# Patient Record
Sex: MALE | Race: WHITE | NOT HISPANIC OR LATINO | Employment: OTHER | URBAN - METROPOLITAN AREA
[De-identification: names, ages, dates, MRNs, and addresses within clinical notes are randomized per-mention and may not be internally consistent; named-entity substitution may affect disease eponyms.]

---

## 2017-02-17 ENCOUNTER — TELEPHONE (OUTPATIENT)
Dept: SURGERY | Facility: CLINIC | Age: 67
End: 2017-02-17

## 2017-02-17 NOTE — TELEPHONE ENCOUNTER
----- Message from PJ Pascal sent at 2/17/2017  3:11 PM CST -----  Contact: self - [phone]  Please call and tell [facility] to call  [phone] that is [facility]     I am still seeingpt and have a bunch of messages and all my charts to do   ----- Message -----     From: Perri Sanchez LPN     Sent: 2/17/2017   1:57 PM       To: PJ Pascal        ----- Message -----     From: Perri Fulton     Sent: 2/17/2017   1:33 PM       To: Maria Eugenia White Staff    maria eugenia - needs refills on ostomy supplies called in to [facility] - pt does not have their phone number - please call patient at

## 2017-03-01 DIAGNOSIS — Z43.2 ATTENTION TO ILEOSTOMY: Primary | ICD-10-CM

## 2018-04-24 ENCOUNTER — TELEPHONE (OUTPATIENT)
Dept: SURGERY | Facility: CLINIC | Age: 68
End: 2018-04-24

## 2018-04-24 NOTE — TELEPHONE ENCOUNTER
Pt says he has just been diagnosed with Prostate cancer. He wanted to know if Dr Malone would treat that. Told him it would a Urologist. He is going to wait to see what the doctor that did the bx tells him. If not comfortable will call to make an appt here.

## 2018-04-24 NOTE — TELEPHONE ENCOUNTER
----- Message from Selina Moss sent at 4/24/2018  2:35 PM CDT -----  Contact: pt#[phone]  Pt states that he has other issue and he wants to know if Dr Malone can help with it. He did not want to give details

## 2018-05-14 ENCOUNTER — LAB VISIT (OUTPATIENT)
Dept: LAB | Facility: HOSPITAL | Age: 68
End: 2018-05-14
Attending: UROLOGY
Payer: MEDICARE

## 2018-05-14 ENCOUNTER — OFFICE VISIT (OUTPATIENT)
Dept: UROLOGY | Facility: CLINIC | Age: 68
End: 2018-05-14
Payer: MEDICARE

## 2018-05-14 VITALS
WEIGHT: 248.88 LBS | HEIGHT: 71 IN | DIASTOLIC BLOOD PRESSURE: 69 MMHG | SYSTOLIC BLOOD PRESSURE: 133 MMHG | BODY MASS INDEX: 34.84 KG/M2 | HEART RATE: 67 BPM

## 2018-05-14 DIAGNOSIS — C61 PROSTATE CANCER: Primary | ICD-10-CM

## 2018-05-14 DIAGNOSIS — C61 PROSTATE CANCER: ICD-10-CM

## 2018-05-14 DIAGNOSIS — R35.1 NOCTURIA: ICD-10-CM

## 2018-05-14 DIAGNOSIS — D49.59 NEOPLASM OF PROSTATE: ICD-10-CM

## 2018-05-14 DIAGNOSIS — N40.1 BENIGN NON-NODULAR PROSTATIC HYPERPLASIA WITH LOWER URINARY TRACT SYMPTOMS: ICD-10-CM

## 2018-05-14 LAB
ANION GAP SERPL CALC-SCNC: 6 MMOL/L
BUN SERPL-MCNC: 12 MG/DL
CALCIUM SERPL-MCNC: 8.7 MG/DL
CHLORIDE SERPL-SCNC: 112 MMOL/L
CO2 SERPL-SCNC: 24 MMOL/L
COMPLEXED PSA SERPL-MCNC: 4 NG/ML
CREAT SERPL-MCNC: 0.9 MG/DL
EST. GFR  (AFRICAN AMERICAN): >60 ML/MIN/1.73 M^2
EST. GFR  (NON AFRICAN AMERICAN): >60 ML/MIN/1.73 M^2
GLUCOSE SERPL-MCNC: 115 MG/DL
POTASSIUM SERPL-SCNC: 4.3 MMOL/L
SODIUM SERPL-SCNC: 142 MMOL/L

## 2018-05-14 PROCEDURE — 84153 ASSAY OF PSA TOTAL: CPT

## 2018-05-14 PROCEDURE — 80048 BASIC METABOLIC PNL TOTAL CA: CPT

## 2018-05-14 PROCEDURE — 99999 PR PBB SHADOW E&M-EST. PATIENT-LVL III: CPT | Mod: PBBFAC,,, | Performed by: UROLOGY

## 2018-05-14 PROCEDURE — 99204 OFFICE O/P NEW MOD 45 MIN: CPT | Mod: S$GLB,,, | Performed by: UROLOGY

## 2018-05-14 PROCEDURE — 36415 COLL VENOUS BLD VENIPUNCTURE: CPT

## 2018-05-14 RX ORDER — SIMVASTATIN 40 MG/1
40 TABLET, FILM COATED ORAL
COMMUNITY
Start: 2018-04-17

## 2018-05-14 RX ORDER — FINASTERIDE 5 MG/1
5 TABLET, FILM COATED ORAL
COMMUNITY
Start: 2018-04-17 | End: 2019-04-16

## 2018-05-14 RX ORDER — AMLODIPINE BESYLATE 10 MG/1
10 TABLET ORAL
COMMUNITY
Start: 2018-04-17 | End: 2019-06-18

## 2018-05-14 RX ORDER — ASPIRIN 325 MG
325 TABLET ORAL DAILY
COMMUNITY
End: 2019-04-16

## 2018-05-14 RX ORDER — CARVEDILOL 3.12 MG/1
3.12 TABLET ORAL
COMMUNITY
Start: 2018-04-17

## 2018-05-14 RX ORDER — CLOPIDOGREL BISULFATE 75 MG/1
75 TABLET ORAL
COMMUNITY
Start: 2018-04-17

## 2018-05-14 RX ORDER — TRAMADOL HYDROCHLORIDE 50 MG/1
50 TABLET ORAL
COMMUNITY
Start: 2018-02-20

## 2018-05-14 NOTE — PROGRESS NOTES
CHIEF COMPLAINT:    Mr. Bernard is a 68 y.o. male presenting for a consultation. Patient presents with Prostate Cancer.    PRESENTING ILLNESS:    Sha Bernard is a 68 y.o. male w/ hx of UC s/p total abdominal colectomy in 2015, DVT w/ asymtptomatic PE (provoked) IVC filter still in place, CAD w/ MI in 2004 (2 stents on aspirin and plavix), HTN, who presents with emily 6 prostate cancer on biopsy 4/12/18 for elevated PSA, (he does not have PSA records with him today), saw Dr. Aragon in [place] who was planning on open prostatectomy. Denies any FH of prostate cancer or other  malignancy. On finasteride and tamsulosin for BPH, he reports his symptoms are well controlled. Denies blood in urine.     Erectile dysfunction, has failed viagra in the past, possibly interested in ICI in the future     He also reports chronic fatigue and back pain. Denies bone pain/ night sweats. 10lb weight loss since girlfriend passing away 2 months ago.      Prostate Biopsy 4/13/18: Right 3+3=6 5/8 cores 19%, Left 3+3=6 3/8 cores 18%      REVIEW OF SYSTEMS:    Sha Bernard  Endorses SOB on exertion, denies chest pain, headache, blurred vision, fever, nausea, vomiting, chills, abdominal pain, bleeding per rectum, cough, recent loss of consciousness, recent mental status changes, seizures, dizziness, or upper or lower extremity weakness.      PATIENT HISTORY:    Past Medical History:   Diagnosis Date    Arthritis     CAD (coronary artery disease)     DVT (deep venous thrombosis)     HLD (hyperlipidemia)     HTN (hypertension)     Inflammatory bowel diseases (IBD)     PE (pulmonary embolism)        Past Surgical History:   Procedure Laterality Date    CARDIAC SURGERY      EYE SURGERY      FRACTURE SURGERY      HERNIA REPAIR      SKIN BIOPSY      TONSILLECTOMY      VASCULAR SURGERY         History reviewed. No pertinent family history.    Social History     Social History    Marital status:      Spouse name: N/A     Number of children: N/A    Years of education: N/A     Occupational History    Not on file.     Social History Main Topics    Smoking status: Never Smoker    Smokeless tobacco: Not on file    Alcohol use No    Drug use: No    Sexual activity: Not on file     Other Topics Concern    Not on file     Social History Narrative    No narrative on file       Allergies:  Patient has no known allergies.    Medications:    Current Outpatient Prescriptions:     amLODIPine (NORVASC) 10 MG tablet, Take 10 mg by mouth., Disp: , Rfl:     aspirin 325 MG tablet, Take 325 mg by mouth once daily., Disp: , Rfl:     atorvastatin (LIPITOR) 40 MG tablet, Take 40 mg by mouth once daily., Disp: , Rfl:     carvedilol (COREG) 3.125 MG tablet, Take 3.125 mg by mouth., Disp: , Rfl:     clopidogrel (PLAVIX) 75 mg tablet, Take 75 mg by mouth., Disp: , Rfl:     finasteride (PROSCAR) 5 mg tablet, Take 5 mg by mouth., Disp: , Rfl:     metoprolol tartrate (LOPRESSOR) 50 MG tablet, Take 50 mg by mouth 2 (two) times daily., Disp: , Rfl:     tamsulosin (FLOMAX) 0.4 mg Cp24, Take 0.8 mg by mouth once daily., Disp: , Rfl:     traMADol (ULTRAM) 50 mg tablet, Take 50 mg by mouth., Disp: , Rfl:     lisinopril (PRINIVIL,ZESTRIL) 20 MG tablet, Take 20 mg by mouth once daily., Disp: , Rfl:     simvastatin (ZOCOR) 40 MG tablet, Take 40 mg by mouth., Disp: , Rfl:     PHYSICAL EXAMINATION:    The patient generally appears in good health, is appropriately interactive, and is in no apparent distress.     Eyes: anicteric sclerae, moist conjunctivae; no lid-lag; PERRLA     HENT: Atraumatic; oropharynx clear with moist mucous membranes and no mucosal ulcerations;normal hard and soft palate.  No evidence of lymphadenopathy.    Neck: Trachea midline.  No thyromegaly.    Musculoskeletal: No abnormal gait.    Skin: No lesions.    Mental: Cooperative with normal affect.  Is oriented to time, place, and person.    Neuro: Grossly intact.    Chest: Normal  inspiratory effort.   No accessory muscles.  No audible wheezes.  Respirations symmetric on inspiration and expiration.    Heart: Regular rhythm.      Abdomen:  Soft, non-tender. No masses or organomegaly. Ileostomy in RLQ, with parastomal hernia, umbilical hernia at previous extraction site from TAC, other port sites well healed. Bladder is not palpable. No evidence of flank discomfort. No evidence of inguinal hernia.    Genitourinary: The penis is circumcised with no evidence of plaques or induration. The urethral meatus is normal. The testes, epididymides, and cord structures are normal in size and contour bilaterally, small granulomas are present bilaterally at site of previous vasectomy. The scrotum is normal in size and contour.    Normal anal sphincter tone. No rectal mass.    The prostate is 35 g. Normal landmarks. Lateral sulci. Median furrow intact.  No nodularity or induration. Seminal vesicles not palpable.    Extremities: No clubbing, cyanosis, or edema      LABS:    No results found for: PSA, PSADIAG, PSATOTAL, PSAFREE, PSAFREEPCT    IMPRESSION:    Encounter Diagnoses   Name Primary?    Prostate cancer Yes    Benign non-nodular prostatic hyperplasia with lower urinary tract symptoms     Nocturia     Neoplasm of prostate      Options for treatment and surveillance of prostate cancer were discussed including radiation, brachytherapy, prostatectomy, and active surveillance. Patient would like treatment for his prostate cancer and is interested in radiation treatment, and would like MRI. We will obtain PSA today in order to risk stratify his prostate cancer.     PLAN:    1. PSA  2. Appointment to see Dr. Fox with Radiation Oncology  3. CMP  4. Prostate MRI      Copy to:

## 2018-05-15 ENCOUNTER — TELEPHONE (OUTPATIENT)
Dept: UROLOGY | Facility: CLINIC | Age: 68
End: 2018-05-15

## 2018-05-15 ENCOUNTER — HOSPITAL ENCOUNTER (OUTPATIENT)
Dept: RADIOLOGY | Facility: HOSPITAL | Age: 68
Discharge: HOME | End: 2018-05-15
Attending: UROLOGY
Payer: MEDICARE

## 2018-05-15 DIAGNOSIS — D49.59 NEOPLASM OF PROSTATE: ICD-10-CM

## 2018-05-15 PROCEDURE — 72197 MRI PELVIS W/O & W/DYE: CPT | Mod: 26,,, | Performed by: RADIOLOGY

## 2018-05-15 PROCEDURE — 25500020 PHARM REV CODE 255: Performed by: UROLOGY

## 2018-05-15 PROCEDURE — A9585 GADOBUTROL INJECTION: HCPCS | Performed by: UROLOGY

## 2018-05-15 PROCEDURE — 72197 MRI PELVIS W/O & W/DYE: CPT | Mod: TC

## 2018-05-15 RX ORDER — GADOBUTROL 604.72 MG/ML
10 INJECTION INTRAVENOUS
Status: COMPLETED | OUTPATIENT
Start: 2018-05-15 | End: 2018-05-15

## 2018-05-15 RX ADMIN — GADOBUTROL 10 ML: 604.72 INJECTION INTRAVENOUS at 10:05

## 2018-05-15 NOTE — TELEPHONE ENCOUNTER
----- Message from Quang Pinto MD sent at 5/14/2018  5:06 PM CDT -----  Please notify the patient that his psa was 4.0. This is normal.

## 2018-05-21 DIAGNOSIS — R97.20 ELEVATED PSA: Primary | ICD-10-CM

## 2018-05-21 RX ORDER — CIPROFLOXACIN 500 MG/1
TABLET ORAL
Qty: 4 TABLET | Refills: 0 | Status: SHIPPED | OUTPATIENT
Start: 2018-05-21 | End: 2019-04-16

## 2018-05-22 ENCOUNTER — TELEPHONE (OUTPATIENT)
Dept: UROLOGY | Facility: CLINIC | Age: 68
End: 2018-05-22

## 2018-06-27 ENCOUNTER — PROCEDURE VISIT (OUTPATIENT)
Dept: UROLOGY | Facility: CLINIC | Age: 68
End: 2018-06-27
Payer: MEDICARE

## 2018-06-27 VITALS
TEMPERATURE: 99 F | BODY MASS INDEX: 34.9 KG/M2 | HEART RATE: 65 BPM | WEIGHT: 249.31 LBS | HEIGHT: 71 IN | RESPIRATION RATE: 20 BRPM | DIASTOLIC BLOOD PRESSURE: 72 MMHG | SYSTOLIC BLOOD PRESSURE: 119 MMHG

## 2018-06-27 DIAGNOSIS — R97.20 ELEVATED PSA: Primary | ICD-10-CM

## 2018-06-27 PROCEDURE — 76872 US TRANSRECTAL: CPT | Mod: S$GLB,,, | Performed by: UROLOGY

## 2018-06-27 PROCEDURE — 88305 TISSUE EXAM BY PATHOLOGIST: CPT | Mod: 26,,, | Performed by: PATHOLOGY

## 2018-06-27 PROCEDURE — 76942 ECHO GUIDE FOR BIOPSY: CPT | Mod: 59,S$GLB,, | Performed by: UROLOGY

## 2018-06-27 PROCEDURE — 55700 PR BIOPSY OF PROSTATE,NEEDLE/PUNCH: CPT | Mod: S$GLB,,, | Performed by: UROLOGY

## 2018-06-27 PROCEDURE — 88305 TISSUE EXAM BY PATHOLOGIST: CPT | Performed by: PATHOLOGY

## 2018-06-27 RX ORDER — LIDOCAINE HYDROCHLORIDE 20 MG/ML
JELLY TOPICAL ONCE
Status: COMPLETED | OUTPATIENT
Start: 2018-06-27 | End: 2018-06-27

## 2018-06-27 RX ORDER — LIDOCAINE HYDROCHLORIDE 10 MG/ML
1 INJECTION INFILTRATION; PERINEURAL
Status: COMPLETED | OUTPATIENT
Start: 2018-06-27 | End: 2018-06-27

## 2018-06-27 RX ADMIN — LIDOCAINE HYDROCHLORIDE 1 ML: 10 INJECTION INFILTRATION; PERINEURAL at 11:06

## 2018-06-27 RX ADMIN — LIDOCAINE HYDROCHLORIDE: 20 JELLY TOPICAL at 10:06

## 2018-06-27 NOTE — PATIENT INSTRUCTIONS

## 2018-06-27 NOTE — PROCEDURES
Procedures     Pre-procedure diagnosis:   1. Prostate cancer, [place] 3+3  2. PIRADS 5 prostate lesion on MRI      Post-procedure diagnosis: same    Procedure: Transrectal URONAV MRI fusion-ultrasound guided prostate biopsy    TRUS size: 44.1cc    Complications: none    Blood loss: minimal    Surgeon: Quang Pinto MD    Anesthesia: 10cc lidocaine jelly, 20cc 1% lidocaine for prostatic block    Procedure: The risks and benefits of the procedure were explained to the patient and consent was obtained.  The patient laid in the lateral decubitus position.  10cc of lidocaine jelly was used for local analgesia in the rectum.  The ultrasound probe was advanced into the rectum. The prostate was visualized.  There was not a median lobe.  20cc of 1% lidocaine without epi was used for a prostatic nerve block.    At this point, a sweep of the prostate was performed from base to apex and the transverse plane using the ultrasound and URONAV platform.  Landmarks were then labeled with anterior, posterior, right, left, base and apex were labeled in the axial as well as sagittal planes.  Segmentation was then performed and manual adjustments were made as needed starting in the sagittal plane followed by the axial plane.  At this point, alignment of the ultrasound with MRI images was ensured using the toggle between ultrasound and MRI.      At this point elastic deformation was computed.  Our images were satisfactory.  At this point, we performed 5 biopsies of a target lesion.  Subsequent to that, a standard 12core biopsy was performed, 2 from the apex, mid and base from the right and left side.    The patient tolerated the procedure well and was discharged home.  We will call him when the results are available for review.  He will finish the course of antibiotics.

## 2018-07-05 ENCOUNTER — TELEPHONE (OUTPATIENT)
Dept: WOUND CARE | Facility: CLINIC | Age: 68
End: 2018-07-05

## 2018-07-05 NOTE — TELEPHONE ENCOUNTER
----- Message from Yg Hurtado sent at 7/3/2018  1:56 PM CDT -----  Contact: Pt:[phone]  .Rx Refill/Request     Is this a Refill or New Rx:Rfill    Rx Name and Strength: Ostomy bags  Preferred Pharmacy with phone number: [facility] pt does not know the name  Communication Preference:Pt:[phone]  Additional Information:

## 2018-07-05 NOTE — TELEPHONE ENCOUNTER
Connected pt with [facility] as his supplier and order shipped within 24 hours.   New script signed and faxed

## 2019-04-01 ENCOUNTER — OFFICE VISIT (OUTPATIENT)
Dept: UROLOGY | Facility: CLINIC | Age: 69
End: 2019-04-01
Payer: MEDICARE

## 2019-04-01 ENCOUNTER — LAB VISIT (OUTPATIENT)
Dept: LAB | Facility: HOSPITAL | Age: 69
End: 2019-04-01
Attending: UROLOGY
Payer: MEDICARE

## 2019-04-01 VITALS
HEART RATE: 57 BPM | HEIGHT: 71 IN | BODY MASS INDEX: 35.42 KG/M2 | SYSTOLIC BLOOD PRESSURE: 150 MMHG | DIASTOLIC BLOOD PRESSURE: 79 MMHG | WEIGHT: 253 LBS | TEMPERATURE: 98 F

## 2019-04-01 DIAGNOSIS — C61 PROSTATE CANCER: Primary | ICD-10-CM

## 2019-04-01 DIAGNOSIS — N52.01 ERECTILE DYSFUNCTION DUE TO ARTERIAL INSUFFICIENCY: ICD-10-CM

## 2019-04-01 DIAGNOSIS — D49.59 NEOPLASM OF PROSTATE: Primary | ICD-10-CM

## 2019-04-01 DIAGNOSIS — C61 PROSTATE CANCER: ICD-10-CM

## 2019-04-01 LAB — COMPLEXED PSA SERPL-MCNC: 9.3 NG/ML (ref 0–4)

## 2019-04-01 PROCEDURE — 84153 ASSAY OF PSA TOTAL: CPT

## 2019-04-01 PROCEDURE — 99999 PR PBB SHADOW E&M-EST. PATIENT-LVL III: ICD-10-PCS | Mod: PBBFAC,,, | Performed by: UROLOGY

## 2019-04-01 PROCEDURE — 99214 PR OFFICE/OUTPT VISIT, EST, LEVL IV, 30-39 MIN: ICD-10-PCS | Mod: S$GLB,,, | Performed by: UROLOGY

## 2019-04-01 PROCEDURE — 99999 PR PBB SHADOW E&M-EST. PATIENT-LVL III: CPT | Mod: PBBFAC,,, | Performed by: UROLOGY

## 2019-04-01 PROCEDURE — 99214 OFFICE O/P EST MOD 30 MIN: CPT | Mod: S$GLB,,, | Performed by: UROLOGY

## 2019-04-01 PROCEDURE — 36415 COLL VENOUS BLD VENIPUNCTURE: CPT

## 2019-04-01 NOTE — PROGRESS NOTES
Subjective:       Patient ID: Sha Bernard is a 69 y.o. male.    Chief Complaint:  Other (discuss surgery)        History of Present Illness  HPI  Patient is a 69 y.o. male who is established to our clinic and was initially self-referred for evaluation of an elevated psa.  He previously underwent a URONAV showing multi-focal [place] 3+3 prostate cancer.     He was somewhat lost to f/u.  He returns today saying he is ready to have his prostate cancer treated.   He had an MRI showing a 2cm PIRADS 5 lesion without JINNY, no SV involvement, no NV bundle involvement.          Review of Systems  Review of Systems  All other systems reviewed and negative except pertinent positives noted in HPI.       Objective:     Physical Exam   Nursing note and vitals reviewed.  Constitutional: He is oriented to person, place, and time. Vital signs are normal. He appears well-developed and well-nourished. He is cooperative.   HENT:   Head: Normocephalic.   Neck: No tracheal deviation present.   Cardiovascular: Normal rate and intact distal pulses.    Pulmonary/Chest: Effort normal. No accessory muscle usage. No tachypnea. No respiratory distress.   Abdominal: Soft. Normal appearance. He exhibits no distension, no fluid wave, no ascites and no mass. There is no tenderness. There is no rebound and no CVA tenderness. No hernia.   Musculoskeletal: Normal range of motion.   Lymphadenopathy:        Right: No inguinal adenopathy present.        Left: No inguinal adenopathy present.   Neurological: He is alert and oriented to person, place, and time. He has normal strength.   Skin: No bruising and no rash noted.     Psychiatric: He has a normal mood and affect. His speech is normal and behavior is normal. Thought content normal.       Lab Review  Lab Results   Component Value Date    COLORU Orange (A) 03/26/2015    SPECGRAV 1.030 03/26/2015    PHUR 6.0 03/26/2015    NITRITE Negative 03/26/2015    KETONESU Negative 03/26/2015    UROBILINOGEN  Negative 03/26/2015         Assessment:        1. Prostate cancer    2. Erectile dysfunction due to arterial insufficiency            Plan:     Prostate cancer  -     Prostate Specific Antigen, Diagnostic; Future; Expected date: 04/01/2019  -     Ambulatory referral to Radiation Oncology    Erectile dysfunction due to arterial insufficiency    -psa today  -refer to Dr. Fox to discuss XRT.  -return to clinic in 1 month to re-discuss.   -I explained at length that given his prior surgical history and his ileostomy site, that surgical removal may not be feasible/safe. He will consider options and will see Dr. Fox.   I spent 25 minutes with the patient of which more than half was spent in direct consultation with the patient in regards to our treatment and plan.    F/u 1 month.

## 2019-04-12 ENCOUNTER — HOSPITAL ENCOUNTER (OUTPATIENT)
Dept: RADIOLOGY | Facility: HOSPITAL | Age: 69
Discharge: HOME | End: 2019-04-12
Attending: UROLOGY
Payer: MEDICARE

## 2019-04-12 DIAGNOSIS — D49.59 NEOPLASM OF PROSTATE: ICD-10-CM

## 2019-04-12 PROCEDURE — 72197 MRI PELVIS W/O & W/DYE: CPT | Mod: 26,,, | Performed by: RADIOLOGY

## 2019-04-12 PROCEDURE — 72197 MRI PELVIS W/O & W/DYE: CPT | Mod: TC

## 2019-04-12 PROCEDURE — 25500020 PHARM REV CODE 255: Performed by: UROLOGY

## 2019-04-12 PROCEDURE — 72197 MRI PROSTATE W W/O CONTRAST: ICD-10-PCS | Mod: 26,,, | Performed by: RADIOLOGY

## 2019-04-12 PROCEDURE — A9585 GADOBUTROL INJECTION: HCPCS | Performed by: UROLOGY

## 2019-04-12 RX ORDER — GADOBUTROL 604.72 MG/ML
10 INJECTION INTRAVENOUS
Status: COMPLETED | OUTPATIENT
Start: 2019-04-12 | End: 2019-04-12

## 2019-04-12 RX ADMIN — GADOBUTROL 10 ML: 604.72 INJECTION INTRAVENOUS at 04:04

## 2019-04-15 LAB
CREAT SERPL-MCNC: 0.9 MG/DL (ref 0.5–1.4)
SAMPLE: NORMAL

## 2019-04-16 ENCOUNTER — TELEPHONE (OUTPATIENT)
Dept: RADIATION ONCOLOGY | Facility: CLINIC | Age: 69
End: 2019-04-16

## 2019-04-16 ENCOUNTER — INITIAL CONSULT (OUTPATIENT)
Dept: RADIATION ONCOLOGY | Facility: CLINIC | Age: 69
End: 2019-04-16
Payer: MEDICARE

## 2019-04-16 VITALS
SYSTOLIC BLOOD PRESSURE: 144 MMHG | HEIGHT: 71 IN | DIASTOLIC BLOOD PRESSURE: 72 MMHG | HEART RATE: 65 BPM | RESPIRATION RATE: 18 BRPM | WEIGHT: 260.13 LBS | BODY MASS INDEX: 36.42 KG/M2

## 2019-04-16 DIAGNOSIS — C61 PROSTATE CANCER: ICD-10-CM

## 2019-04-16 PROCEDURE — 99999 PR PBB SHADOW E&M-EST. PATIENT-LVL III: CPT | Mod: PBBFAC,,, | Performed by: RADIOLOGY

## 2019-04-16 PROCEDURE — 99204 PR OFFICE/OUTPT VISIT, NEW, LEVL IV, 45-59 MIN: ICD-10-PCS | Mod: S$GLB,,, | Performed by: RADIOLOGY

## 2019-04-16 PROCEDURE — 99999 PR PBB SHADOW E&M-EST. PATIENT-LVL III: ICD-10-PCS | Mod: PBBFAC,,, | Performed by: RADIOLOGY

## 2019-04-16 PROCEDURE — 99204 OFFICE O/P NEW MOD 45 MIN: CPT | Mod: S$GLB,,, | Performed by: RADIOLOGY

## 2019-04-16 RX ORDER — LISINOPRIL 40 MG/1
TABLET ORAL
Refills: 1 | COMMUNITY
Start: 2019-03-28

## 2019-04-16 RX ORDER — NAPROXEN SODIUM 220 MG/1
81 TABLET, FILM COATED ORAL
COMMUNITY
Start: 2019-02-26

## 2019-04-16 NOTE — PROGRESS NOTES
HISTORY OF PRESENT ILLNESS:   This patient presents today for discussion of treatment options for a recently diagnosed prostate cancer.     Mr. Bernard's PMHx is significant for ulcerative colitis.  He is status post total abdominal colectomy with end ileostomy per Dr. Malone in 2015.  Currently he has no active symptoms.  He has a history of BPH and was followed by Dr. Syed in [place], [place].  In March of 2018 he was noted to have a PSA of 3.8 ng/ml.  At that time the patient was taking Proscar and Flomax at 0.8 mg per day.  TRUS and biopsy was performed on 4/12/18.  The prostate measured 42 cc.  Biopsies revealed [place] 6 (3+3) adenocarcinoma involving 5 of 8 cores from the Rt. lobe and 3 of 8 cores from the Lt. lobe.   The patient was referred to Dr. Pinto for evaluation.  Repeat PSA on 5/14/18 returned at 4 ng/ml.  Further work up with MRI scan on 5/15/18 revealed a 4.4 x 4.3 x 4.9 cm prostate corresponding to a 39.5 cc gland.  There was 2 cm T2 hypointense lesion in the Rt. posterior peripheral zone, PI-RADS 5.  There was no extraprostatic extension and the seminal vesicles and neurovascular bundles were unremarkable.  There was no lymphadenopathy.    Repeat biopsies on 6/27/18 revealed [place] 6 (3+3) adenocarcinoma in biopsies from the Lt. apex, Rt. apex, Rt. mid gland and the targeted lesion.  The tumor involved 5% of the specimens from each of the prostate regions and 10% of the targeted specimens.  The patient elected to proceed with active surveillance.  Repeat PSA on 4/1/19  returned at 9.3 ng/ml.  Repeat MRI on 4/12/19 revealed a 4.0 x 4.8 x 4.3 cm prostate corresponding to a 50.4 cc gland.  Again there was a 1.5 cm T2 hypointense lesion in the Rt. peripheral zone, PI-RADS 5. There was capsular abutment without definitve extraprostatic extension.  There was a 1.1 cm T2 hypointense lesion in the Lt. transitional zone, PI-RADS 4. There was no extraprostatic extension.  Again the seminal vesicles  and neurovascular bundles were unremarkable.  There was no adenopathy.  The patient now presents for discussion of treatment options.  Today, the patient states he feels well.  Notes nocturia at 2 - 3 times per night.      REVIEW OF SYSTEMS:   Review of Systems   Constitutional: Negative for chills, fever, malaise/fatigue and weight loss.   Respiratory: Negative for cough, hemoptysis and sputum production.    Cardiovascular: Negative for chest pain, palpitations and orthopnea.   Gastrointestinal: Negative for abdominal pain, constipation, diarrhea, nausea and vomiting.   Genitourinary: Negative for dysuria, frequency, hematuria and urgency.        Occasional urinary hesitancy.          PAST MEDICAL HISTORY:  Past Medical History:   Diagnosis Date    Arthritis     CAD (coronary artery disease)     DVT (deep venous thrombosis)     HLD (hyperlipidemia)     HTN (hypertension)     Inflammatory bowel diseases (IBD)     PE (pulmonary embolism)     Prostate cancer     Prostate cancer 4/16/2019       PAST SURGICAL HISTORY:  Past Surgical History:   Procedure Laterality Date    CARDIAC SURGERY      COLECTOMY-TOTAL ABDOMINAL with End Ileostomy Hand-assisted laparoscopic  Lithotomy N/A 4/17/2015    Performed by [facility] at [facility] OR 2ND FLR    EYE SURGERY      FRACTURE SURGERY      HERNIA REPAIR      ILEOSTOMY - end  N/A 4/17/2015    Performed by [facility] at [facility] OR 2ND FLR    PLACEMENT-IVC FILTER N/A 4/15/2015    Performed by MIGUEL Dan III, MD at [facility] CATH LAB    SKIN BIOPSY      TONSILLECTOMY      VASCULAR SURGERY         ALLERGIES:   Review of patient's allergies indicates:  No Known Allergies    MEDICATIONS:  Current Outpatient Medications   Medication Sig    amLODIPine (NORVASC) 10 MG tablet Take 10 mg by mouth.    aspirin 81 MG Chew Take 81 mg by mouth.    carvedilol (COREG) 3.125 MG tablet Take 3.125 mg by mouth.    clopidogrel (PLAVIX) 75 mg tablet Take 75 mg by mouth.     lisinopril (PRINIVIL,ZESTRIL) 40 MG tablet     simvastatin (ZOCOR) 40 MG tablet Take 40 mg by mouth.    tamsulosin (FLOMAX) 0.4 mg Cp24 Take 0.8 mg by mouth once daily.    traMADol (ULTRAM) 50 mg tablet Take 50 mg by mouth.     No current facility-administered medications for this visit.        SOCIAL HISTORY:  Social History     Socioeconomic History    Marital status:      Spouse name: Not on file    Number of children: Not on file    Years of education: Not on file    Highest education level: Not on file   Occupational History    Not on file   Social Needs    Financial resource strain: Not on file    Food insecurity:     Worry: Not on file     Inability: Not on file    Transportation needs:     Medical: Not on file     Non-medical: Not on file   Tobacco Use    Smoking status: Never Smoker    Smokeless tobacco: Never Used   Substance and Sexual Activity    Alcohol use: No     Alcohol/week: 0.0 oz    Drug use: No    Sexual activity: Not on file   Lifestyle    Physical activity:     Days per week: Not on file     Minutes per session: Not on file    Stress: Not on file   Relationships    Social connections:     Talks on phone: Not on file     Gets together: Not on file     Attends [employer] service: Not on file     Active member of club or organization: Not on file     Attends meetings of clubs or organizations: Not on file     Relationship status: Not on file   Other Topics Concern    Not on file   Social History Narrative    Not on file       FAMILY HISTORY:  History reviewed. No pertinent family history.      PHYSICAL EXAMINATION:  Vitals:    04/16/19 1411   BP: (!) 144/72   Pulse: 65   Resp: 18     Physical Exam   Constitutional: He is oriented to person, place, and time and well-developed, well-nourished, and in no distress.   Pulmonary/Chest: Effort normal. No respiratory distress.   Abdominal: Soft. He exhibits no distension.   Neurological: He is alert and oriented to person,  place, and time.   Psychiatric: Affect and judgment normal.       ASSESSMENT/PLAN:  Stage I (cT1c, cN0, cM0, PSA: 8, Grade Group: 1) adenocarcinoma of the prostate.     ECO    I had a long discussion with the patient and his wife.  We reviewed his diagnosis, stage, grade, risk group, and prognosis. We discussed the concept of low risk, moderate risk, and high risk disease.  I explained to the patient he is considered to have low risk  prostate cancer.  We discussed the different treatment options including active surveillance, prostate brachytherapy, external beam treatment using IMRT /  IGRT techniques and robotic prostatectomy.We discussed the advantages, disadvantages, risks and benefits, as well as complications of each option. Regarding radiation therapy we discussed treatment planning, the different techniques, short and long term complications. Overall the patient, on paper, would be considered for prostate brachytherapy although I explained I am somewhat concerned about the inflammation following the procedure and his underlying LUTS.  Explained after the procedure, he could develop worsening urinary hesitancy, increased frequency and possible urinary retention requiring use of a catheter.   The patient is considering his options.  Will plan repeat PSA with follow up in .      Psychosocial Distress screening score of Distress Score: 2 noted and reviewed. No intervention indicated.    I spent approximately 60 minutes reviewing the available records and evaluating the patient, out of which over 50% of the time was spent face to face with the patient in counseling and coordinating this patient's care.

## 2019-04-16 NOTE — LETTER
April 16, 2019      Quang Pinto MD  [address]  [facility] [address]           [facility] - Radiation Oncology  [address]  [facility] [address]  Phone: [phone]          Patient: Sha Bernard   MR Number: [id number]   YOB: 1950   Date of Visit: 4/16/2019       Dear Dr. Quang Pinto:    Thank you for referring Sha Bernard to me for evaluation. Attached you will find relevant portions of my assessment and plan of care.    If you have questions, please do not hesitate to call me. I look forward to following Sha Bernard along with you.    Sincerely,    Sam Fox Jr., MD    Enclosure  CC:  No Recipients    If you would like to receive this communication electronically, please contact [email or web address] or [phone] to request more information on [employer] access.    For providers and/or their staff who would like to refer a patient to [facility], please contact us through our one-stop-shop provider referral line, [facility] [place], at [phone].    If you feel you have received this communication in error or would no longer like to receive these types of communications, please e-mail [email or web address]

## 2019-06-11 ENCOUNTER — LAB VISIT (OUTPATIENT)
Dept: LAB | Facility: HOSPITAL | Age: 69
End: 2019-06-11
Attending: RADIOLOGY
Payer: MEDICARE

## 2019-06-11 DIAGNOSIS — C61 PROSTATE CANCER: ICD-10-CM

## 2019-06-11 LAB — COMPLEXED PSA SERPL-MCNC: 9.6 NG/ML (ref 0–4)

## 2019-06-11 PROCEDURE — 36415 COLL VENOUS BLD VENIPUNCTURE: CPT

## 2019-06-11 PROCEDURE — 84153 ASSAY OF PSA TOTAL: CPT

## 2019-06-18 ENCOUNTER — OFFICE VISIT (OUTPATIENT)
Dept: RADIATION ONCOLOGY | Facility: CLINIC | Age: 69
End: 2019-06-18
Payer: MEDICARE

## 2019-06-18 VITALS
WEIGHT: 267.63 LBS | HEART RATE: 69 BPM | DIASTOLIC BLOOD PRESSURE: 74 MMHG | BODY MASS INDEX: 37.47 KG/M2 | SYSTOLIC BLOOD PRESSURE: 149 MMHG | RESPIRATION RATE: 16 BRPM | HEIGHT: 71 IN

## 2019-06-18 DIAGNOSIS — C61 PROSTATE CANCER: Primary | ICD-10-CM

## 2019-06-18 PROCEDURE — 99212 PR OFFICE/OUTPT VISIT, EST, LEVL II, 10-19 MIN: ICD-10-PCS | Mod: S$GLB,,, | Performed by: RADIOLOGY

## 2019-06-18 PROCEDURE — 99212 OFFICE O/P EST SF 10 MIN: CPT | Mod: S$GLB,,, | Performed by: RADIOLOGY

## 2019-06-18 PROCEDURE — 99999 PR PBB SHADOW E&M-EST. PATIENT-LVL III: CPT | Mod: PBBFAC,,, | Performed by: RADIOLOGY

## 2019-06-18 PROCEDURE — 99999 PR PBB SHADOW E&M-EST. PATIENT-LVL III: ICD-10-PCS | Mod: PBBFAC,,, | Performed by: RADIOLOGY

## 2019-06-18 NOTE — PROGRESS NOTES
Subjective:       Patient ID: Sha Bernard is a 69 y.o. male.    Chief Complaint: Prostate Cancer (2mo f/u;psa)    This patient returns for discussion of treatment options for a recently diagnosed prostate cancer.     Mr. Bernard has a history of Stage I (cT1c, cN0, cM0, PSA: 8, Grade Group: 1) adenocarcinoma of the prostate.  His PMHx is significant for ulcerative colitis.  He is status post total abdominal colectomy with end ileostomy per Dr. Malone in 2015.  Currently he has no active symptoms.  He has a history of BPH and was followed by Dr. Syed in [place], [place].  In March of 2018 he was noted to have a PSA of 3.8 ng/ml.  At that time the patient was taking Proscar and Flomax at 0.8 mg per day.  TRUS and biopsy was performed on 4/12/18.  The prostate measured 42 cc.  Biopsies revealed [place] 6 (3+3) adenocarcinoma involving 5 of 8 cores from the Rt. lobe and 3 of 8 cores from the Lt. lobe.   The patient was referred to Dr. Pinto for evaluation.  Repeat PSA on 5/14/18 returned at 4 ng/ml.  Further work up with MRI scan on 5/15/18 revealed a 4.4 x 4.3 x 4.9 cm prostate corresponding to a 39.5 cc gland.  There was 2 cm T2 hypointense lesion in the Rt. posterior peripheral zone, PI-RADS 5.  There was no extraprostatic extension and the seminal vesicles and neurovascular bundles were unremarkable.  There was no lymphadenopathy.      Repeat biopsies on 6/27/18 revealed [place] 6 (3+3) adenocarcinoma in biopsies from the Lt. apex, Rt. apex, Rt. mid gland and the targeted lesion.  The tumor involved 5% of the specimens from each of the prostate regions and 10% of the targeted specimens.  The patient elected to proceed with active surveillance.  Repeat PSA on 4/1/19  returned at 9.3 ng/ml.  Repeat MRI on 4/12/19 revealed a 4.0 x 4.8 x 4.3 cm prostate corresponding to a 50.4 cc gland.  Again there was a 1.5 cm T2 hypointense lesion in the Rt. peripheral zone, PI-RADS 5. There was capsular abutment without  definitve extraprostatic extension.  There was a 1.1 cm T2 hypointense lesion in the Lt. transitional zone, PI-RADS 4. There was no extraprostatic extension.  Again the seminal vesicles and neurovascular bundles were unremarkable.  There was no adenopathy.  The patient returns for further discussion of treatment options.     Review of Systems   Constitutional: Negative for activity change, appetite change, chills and fatigue.   Respiratory: Negative for cough and shortness of breath.    Cardiovascular: Negative for chest pain and palpitations.   Gastrointestinal: Negative for abdominal pain.   Genitourinary: Negative for difficulty urinating, dysuria, frequency and hematuria.        Nocturia at 3 times per night but denies dysuria or urinary hesitancy.        PSA - 9.6 ng/ml  Objective:      Physical Exam   Constitutional: He appears well-developed and well-nourished. No distress.   Abdominal: Soft. He exhibits no distension. There is no tenderness.       Assessment:       1. Prostate cancer        Plan:       Again discussed the options of external beam therapy with IMRT and IGRT techniques or prostate brachytherapy using Palladium 103 seeds.  Discussed the pros and cons of therapy. Again explained we have some concern about exacerbation of his LUTS with prostate brachytherapy.  The patient is considering his options.

## 2020-04-20 ENCOUNTER — TELEPHONE (OUTPATIENT)
Dept: WOUND CARE | Facility: CLINIC | Age: 70
End: 2020-04-20

## 2020-04-20 NOTE — TELEPHONE ENCOUNTER
I left message that he has to order from his DME and my records show that to be [facility]. I gave him name and number of [facility]  If there was something else he needed and message taken wrong to call back tomorrow please

## 2020-04-20 NOTE — TELEPHONE ENCOUNTER
----- Message from Selina Moss sent at 4/20/2020  8:31 AM CDT -----  Contact: pt   Pt is calling to order supplies. Please call

## 2022-02-17 ENCOUNTER — TELEPHONE (OUTPATIENT)
Dept: SURGERY | Facility: CLINIC | Age: 72
End: 2022-02-17
Payer: MEDICARE

## 2022-02-17 NOTE — TELEPHONE ENCOUNTER
Spoke with patient. Patient states that he is having irritation around stoma site with a little blood around stoma. Instructed patient that sometimes this does happen and stated that stoma has shrunk in size.Offered patient appointment with Yanely or matias for tomorrow. Patient wishes to speak on phone first and will come down next week if needed. Call transferred to DIANNA Lopez's office. Message sent to Donaldo to reach out to discuss with patient.

## 2022-02-17 NOTE — TELEPHONE ENCOUNTER
----- Message from Moriah Malone sent at 2/17/2022  2:05 PM CST -----  Contact: [phone]  Who Called: pt   Regarding: personal   Would the patient rather a call back or a response via [employer]? Call back  Best Call Back Number: [phone]  Additional Information:

## 2022-02-22 ENCOUNTER — TELEPHONE (OUTPATIENT)
Dept: SURGERY | Facility: CLINIC | Age: 72
End: 2022-02-22
Payer: MEDICARE

## 2022-02-22 NOTE — TELEPHONE ENCOUNTER
Having difficulty getting good seal. Skin red, weeping, occasional bleeding. Discussed crusting technique. Using a convex bag. Can wear a bag for up to 14 days.   Discussed changing at 5 days.     Will try above before making an appt to be seen as he lives far.       ----- Message from Rosi Chisholm sent at 2/22/2022 12:44 PM CST -----  Sha Bernard calling regarding Patient Advice (message) stated he can't get ostomy bag to seal properly. [phone]

## 2024-09-06 ENCOUNTER — NURSE TRIAGE (OUTPATIENT)
Dept: ADMINISTRATIVE | Facility: CLINIC | Age: 74
End: 2024-09-06
Payer: MEDICARE

## 2024-09-06 NOTE — TELEPHONE ENCOUNTER
RN returned patient's call -- pt is having irritation under stoma site. Offered to schedule appt for pt -- pt declined, states he lives in [place]. Offered to send a message to Yanely, who he has spoken to in the past regarding stoma care and irritation.     Advised pt for immediate concerns of infection or excessive bleeding to proceed to urgent care or ER. Pt verbalized understanding.     Message routed to Yanely SAHA

## 2024-09-06 NOTE — TELEPHONE ENCOUNTER
OOC RN  Patient reports he has a stoma and appears to be bleeding, spotting.   Dr. Malone,  [facility] did ileostomy s/p 8 years.  Pain to stoma under bag,   7/10 pain    Care advise is to see physician within 4 hours or your PCP Triage     Patient moved to [place],   and advised patient to call his own pcp. Offered VV, refused,  He does not have PCP yet  in [place].   Advised to go to UC or ED. [facility], refused. For any new or worsening symptoms to call back, number given and patient vu   Reason for Disposition   [1] Abdomen skin around stoma looks infected (e.g., spreading redness, pus) AND [2] fever    Additional Information   Negative: Shock suspected (e.g., cold/pale/clammy skin, too weak to stand, low BP, rapid pulse)   Negative: Sounds like a life-threatening emergency to the triager   Negative: [1] SEVERE abdominal pain (e.g., excruciating) AND [2] present > 1 hour   Negative: [1] Bleeding from stoma tissue (stoma is moist, pink to red-colored tissue) AND [2] won't stop after 10 minutes of direct pressure (using correct technique)   Negative: Bloody stool (blood clots; or passing blood without stool)   Negative: Black or tarry bowel movements  (Exception: Chronic-unchanged black-grey BMs AND is taking iron pills or Pepto-Bismol.)   Negative: Stoma separates from the surrounding skin at the suture line (e.g., gaping wound next to stoma)   Negative: Stoma turns purple or black   Negative: [1] No stool or gas from ILEOSTOMY > 6 hours AND [2] abdominal pain or vomiting   Negative: [1] No stool or gas from ILEOSTOMY > 6 hours AND [2] no improvement after using Care Advice   Negative: [1] No stool or gas from COLOSTOMY > 48 hours (2 days) AND [2] abdominal pain or vomiting   Negative: [1] Vomiting AND [2] contains bile (green color)   Negative: [1] Drinking very little AND [2] dehydration suspected (e.g., no urine > 12 hours, very dry mouth, very lightheaded)   Negative: Patient sounds very sick or weak to the triager    Negative: [1] MILD-MODERATE abdominal pain AND [2] constant AND [3] present > 2 hours   Negative: [1] Vomiting AND [2] abdomen looks much more swollen than usual    Protocols used: Ostomy Symptoms and Anfncoryl-B-HN